# Patient Record
Sex: FEMALE | Race: WHITE | URBAN - METROPOLITAN AREA
[De-identification: names, ages, dates, MRNs, and addresses within clinical notes are randomized per-mention and may not be internally consistent; named-entity substitution may affect disease eponyms.]

---

## 2018-03-02 ENCOUNTER — OFFICE VISIT (OUTPATIENT)
Dept: FAMILY MEDICINE | Facility: CLINIC | Age: 83
End: 2018-03-02
Payer: MEDICARE

## 2018-03-02 VITALS
HEIGHT: 60 IN | SYSTOLIC BLOOD PRESSURE: 132 MMHG | OXYGEN SATURATION: 98 % | TEMPERATURE: 97.5 F | HEART RATE: 79 BPM | RESPIRATION RATE: 16 BRPM | WEIGHT: 183 LBS | DIASTOLIC BLOOD PRESSURE: 72 MMHG | BODY MASS INDEX: 35.93 KG/M2

## 2018-03-02 DIAGNOSIS — Z91.81 AT RISK FOR FALLING: ICD-10-CM

## 2018-03-02 DIAGNOSIS — Z23 NEED FOR PROPHYLACTIC VACCINATION WITH TETANUS-DIPHTHERIA (TD): ICD-10-CM

## 2018-03-02 DIAGNOSIS — G89.29 CHRONIC PAIN OF RIGHT KNEE: ICD-10-CM

## 2018-03-02 DIAGNOSIS — M25.561 CHRONIC PAIN OF RIGHT KNEE: ICD-10-CM

## 2018-03-02 DIAGNOSIS — G89.29 CHRONIC BILATERAL LOW BACK PAIN WITHOUT SCIATICA: Primary | ICD-10-CM

## 2018-03-02 DIAGNOSIS — M54.50 CHRONIC BILATERAL LOW BACK PAIN WITHOUT SCIATICA: Primary | ICD-10-CM

## 2018-03-02 DIAGNOSIS — Z78.0 ASYMPTOMATIC POSTMENOPAUSAL STATUS: ICD-10-CM

## 2018-03-02 DIAGNOSIS — Z23 NEED FOR PROPHYLACTIC VACCINATION AGAINST STREPTOCOCCUS PNEUMONIAE (PNEUMOCOCCUS): ICD-10-CM

## 2018-03-02 PROCEDURE — 99203 OFFICE O/P NEW LOW 30 MIN: CPT | Performed by: INTERNAL MEDICINE

## 2018-03-02 RX ORDER — ROSUVASTATIN CALCIUM 10 MG/1
TABLET, COATED ORAL
Refills: 0 | COMMUNITY
Start: 2017-12-06

## 2018-03-02 RX ORDER — HYDROCODONE BITARTRATE AND ACETAMINOPHEN 10; 325 MG/1; MG/1
1 TABLET ORAL EVERY 4 HOURS PRN
Qty: 18 TABLET | Refills: 0 | Status: SHIPPED | OUTPATIENT
Start: 2018-03-02

## 2018-03-02 RX ORDER — OFLOXACIN 3 MG/ML
SOLUTION/ DROPS OPHTHALMIC
COMMUNITY
Start: 2017-09-13

## 2018-03-02 RX ORDER — NITROGLYCERIN 0.6 MG/1
TABLET SUBLINGUAL
COMMUNITY
Start: 2017-04-07

## 2018-03-02 RX ORDER — LISINOPRIL 10 MG/1
TABLET ORAL
Refills: 0 | COMMUNITY
Start: 2017-12-06

## 2018-03-02 RX ORDER — LORATADINE 10 MG/1
TABLET ORAL
Refills: 3 | COMMUNITY
Start: 2018-02-16

## 2018-03-02 RX ORDER — AMLODIPINE BESYLATE 10 MG/1
TABLET ORAL
Refills: 0 | COMMUNITY
Start: 2017-12-06

## 2018-03-02 RX ORDER — FENOFIBRATE 160 MG/1
TABLET ORAL
Refills: 0 | COMMUNITY
Start: 2017-12-06

## 2018-03-02 RX ORDER — OXYBUTYNIN CHLORIDE 5 MG/1
TABLET, EXTENDED RELEASE ORAL
Refills: 0 | COMMUNITY
Start: 2017-12-06

## 2018-03-02 RX ORDER — PSEUDOEPHEDRINE HYDROCHLORIDE 60 MG/1
TABLET, FILM COATED ORAL
Refills: 0 | COMMUNITY
Start: 2018-01-02

## 2018-03-02 RX ORDER — GABAPENTIN 600 MG/1
TABLET ORAL
Refills: 0 | COMMUNITY
Start: 2017-12-06

## 2018-03-02 RX ORDER — HYDROCODONE BITARTRATE AND ACETAMINOPHEN 10; 325 MG/1; MG/1
TABLET ORAL
Refills: 0 | COMMUNITY
Start: 2017-06-12

## 2018-03-02 RX ORDER — BUDESONIDE AND FORMOTEROL FUMARATE DIHYDRATE 160; 4.5 UG/1; UG/1
AEROSOL RESPIRATORY (INHALATION)
Refills: 0 | COMMUNITY
Start: 2018-01-03

## 2018-03-02 RX ORDER — OMEPRAZOLE 40 MG/1
CAPSULE, DELAYED RELEASE ORAL
Refills: 0 | COMMUNITY
Start: 2018-02-21

## 2018-03-02 RX ORDER — MELOXICAM 7.5 MG/1
TABLET ORAL
Refills: 0 | COMMUNITY
Start: 2018-01-02

## 2018-03-02 NOTE — PROGRESS NOTES
SUBJECTIVE:   Miranda Shearer is a 82 year old female who presents to clinic today for the following health issues: Presents with twp daughters. She is from [place] and she is visiting her daughters in [place]. She will be going back to [place] shortly and won't be receiving any further medical care from [place] most likely beyond today only.     Musculoskeletal pain-- She notes that she has been having her back pain for several years and it has progressively been getting worse since the past 2 years. She reports that she has degenerative disc disease. She states that she has difficulty standing for more than 3 minutes. She has pain in her neck and cervicogenic headache, but she denies any arm or shoulder pain. She notes that she has weakness in her right leg. She has been favoring her right leg and she is also having tenderness in her right knee. Patient notes that her knee gives out on her and she is afraid of walking. She uses a walker. 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 She takes 2 Tylenol 500 MG twice a day. She takes 1/2 tablet of her hydrocodone 10 MG when she had a flare up of her pain after exerting herself. She started using her hydrocodone since June 2017 and she has not had any refills since then because she does not use it often. She has had monthly injections on her back which were beneficial for her pain.       Problem list and histories reviewed & adjusted, as indicated.  Additional history: as documented    There is no problem list on file for this patient.    History reviewed. No pertinent surgical history.    Social History   Substance Use Topics     Smoking status: Never Smoker     Smokeless tobacco: Never Used     Alcohol use No     History reviewed. No pertinent family history.      Current Outpatient Prescriptions   Medication Sig Dispense Refill     meloxicam (MOBIC) 7.5 MG tablet   0     nitroGLYcerin (NITROSTAT) 0.6 MG sublingual tablet         gabapentin (NEURONTIN) 600 MG tablet   0     amLODIPine (NORVASC) 10 MG tablet   0     oxybutynin (DITROPAN-XL) 5 MG 24 hr tablet   0     omeprazole (PRILOSEC) 40 MG capsule   0     lisinopril (PRINIVIL/ZESTRIL) 10 MG tablet   0     rosuvastatin (CRESTOR) 10 MG tablet   0     fenofibrate 160 MG tablet   0     ranitidine (ZANTAC) 150 MG tablet   0     SUDOGEST 60 MG tablet   0     ofloxacin (OCUFLOX) 0.3 % ophthalmic solution        loratadine (CLARITIN) 10 MG tablet   3     HYDROcodone-acetaminophen (NORCO)  MG per tablet   0     SYMBICORT 160-4.5 MCG/ACT Inhaler   0     HYDROcodone-acetaminophen (NORCO)  MG per tablet Take 1 tablet by mouth every 4 hours as needed for moderate to severe pain maximum 2 tablet(s) per day 18 tablet 0     Labs reviewed in EPIC    Reviewed and updated as needed this visit by clinical staff  Tobacco  Allergies  Meds  Med Hx  Surg Hx  Fam Hx  Soc Hx      Reviewed and updated as needed this visit by Provider         ROS:  Constitutional, HEENT, cardiovascular, pulmonary, GI, , musculoskeletal, neuro, skin, endocrine and psych systems are negative, except as otherwise noted.    This document serves as a record of the services and decisions personally performed and made by Zohaib Rainey MD. It was created on their behalf by Jude Nolen, a trained medical scribe. The creation of this document is based the provider's statements to the medical scribe.  Jude Nolen  March 2, 2018 11:01 AM   OBJECTIVE:     /72  Pulse 79  Temp 97.5  F (36.4  C) (Oral)  Resp 16  Ht 1.524 m (5')  Wt 83 kg (183 lb)  SpO2 98%  BMI 35.74 kg/m2  Body mass index is 35.74 kg/(m^2).  GENERAL: healthy, alert and no distress, overweight, she uses a walker.   EYES: Eyes grossly normal to inspection, EOMI, conjunctivae and sclerae normal  NECK: no adenopathy, no asymmetry, masses, or scars and thyroid normal to palpation  ABDOMEN: soft, nontender, no hepatosplenomegaly, no masses and  bowel sounds normal  MS: Low back and neck tenderness to palpation, cervicogenic headache. Negative straight leg raising. Right Knee tenderness   SKIN: no suspicious lesions or rashes to visible skin   NEURO: Mentation intact and speech normal  PSYCH: mentation appears normal, affect normal/bright    Diagnostic Test Results:  No results found for this or any previous visit.    ASSESSMENT/PLAN:   (M54.5,  G89.29) Chronic bilateral low back pain without sciatica  (primary encounter diagnosis)  Comment: I reviewed this patients history in significant detail  And spoke with patient and daughters at some length . The patient uses infrequent chronic pain medication and is having a currently difficult time with her traveling and a major flare-up of low back pain which appears to be most likely secondary to lumbar degenerative disc disease and lumbar degenerative joint disease . In consideration of the circumstances I see a small amount of HYDROcodone (VICODIN) as a non-problem, which is primarily the purpose of this appointment. She has a prior prescription of this from her primary care physician in [place] and there's absolutely no red flag symptoms or issues today   Plan: HYDROcodone-acetaminophen (NORCO)  MG per        tablet            (M25.561,  G89.29) Chronic pain of right knee  Comment: as detailed above   Plan: HYDROcodone-acetaminophen (NORCO)  MG per        tablet            (Z78.0) Asymptomatic postmenopausal status  Comment:   Plan:     (Z91.81) At risk for falling  Comment:   Plan:     (Z23) Need for prophylactic vaccination against Streptococcus pneumoniae (pneumococcus)  Comment:   Plan:     (Z23) Need for prophylactic vaccination with tetanus-diphtheria (TD)  Comment:   Plan:     Patient Instructions     [facility]    If you have any questions regarding to your visit please contact your care team:     Team Pink:   Clinic Hours Telephone Number   Internal Medicine:  Dr. Keller  Issac Najera, NP       7am-7pm  Monday - Thursday   7am-5pm  Fridays  [phone]  (Appointment scheduling available 24/7)    Questions about your visit?  Team Line  [phone]   Urgent [facility] - [place] [facility] - 11am-9pm Monday-Friday Saturday-Sunday- 9am-5pm   [place] - 5pm-9pm Monday-Friday Saturday-Sunday- 9am-5pm  [phone] - [place]   [phone] - [place]       What options do I have for visits at the clinic other than the traditional office visit?  To expand how we care for you, many of our providers are utilizing electronic visits (e-visits) and telephone visits, when medically appropriate, for interactions with their patients rather than a visit in the clinic.   We also offer nurse visits for many medical concerns. Just like any other service, we will bill your insurance company for this type of visit based on time spent on the phone with your provider. Not all insurance companies cover these visits. Please check with your medical insurance if this type of visit is covered. You will be responsible for any charges that are not paid by your insurance.      E-visits via [email or web address]:  generally incur a $35.00 fee.  Telephone visits:  Time spent on the phone: *charged based on time that is spent on the phone in increments of 10 minutes. Estimated cost:   5-10 mins $30.00   11-20 mins. $59.00   21-30 mins. $85.00   Use LifeWavehart (secure email communication and access to your chart) to send your primary care provider a message or make an appointment. Ask someone on your Team how to sign up for UVLrx Therapeuticst.    For a Price Quote for your services, please call our Consumer Price Line at [phone].    As always, Thank you for trusting us with your health care needs!    Melissa DIAZ CMA ([facility])        The information in this document, created by the medical scribe for me, accurately reflects the services I personally performed and the decisions made by me. I have  reviewed and approved this document for accuracy.   MD Zohaib Damian MD  [facility]

## 2018-03-02 NOTE — PATIENT INSTRUCTIONS
[facility]    If you have any questions regarding to your visit please contact your care team:     Team Pink:   Clinic Hours Telephone Number   Internal Medicine:  Dr. Arianna Najera NP       7am-7pm  Monday - Thursday   7am-5pm  Fridays  [phone]  (Appointment scheduling available 24/7)    Questions about your visit?  Team Line  [phone]   Urgent [facility] - [place] [facility] - 11am-9pm Monday-Friday Saturday-Sunday- 9am-5pm   [place] - 5pm-9pm Monday-Friday Saturday-Sunday- 9am-5pm  [phone] - [place]   [phone] - [place]       What options do I have for visits at the clinic other than the traditional office visit?  To expand how we care for you, many of our providers are utilizing electronic visits (e-visits) and telephone visits, when medically appropriate, for interactions with their patients rather than a visit in the clinic.   We also offer nurse visits for many medical concerns. Just like any other service, we will bill your insurance company for this type of visit based on time spent on the phone with your provider. Not all insurance companies cover these visits. Please check with your medical insurance if this type of visit is covered. You will be responsible for any charges that are not paid by your insurance.      E-visits via When You Wish:  generally incur a $35.00 fee.  Telephone visits:  Time spent on the phone: *charged based on time that is spent on the phone in increments of 10 minutes. Estimated cost:   5-10 mins $30.00   11-20 mins. $59.00   21-30 mins. $85.00   Use ZOOM TVt (secure email communication and access to your chart) to send your primary care provider a message or make an appointment. Ask someone on your Team how to sign up for When You Wish.    For a Price Quote for your services, please call our Consumer Price Line at [phone].    As always, Thank you for trusting us with your health care  needs!    Melissa DIAZ CMA ([facility])

## 2018-03-02 NOTE — MR AVS SNAPSHOT
After Visit Summary   3/2/2018    Miranda Shearer    MRN: [id number]           Patient Information     Date Of Birth          1935        Visit Information        Provider Department      3/2/2018 10:50 AM Zohaib Rainey MD [facility]        Today's Diagnoses     Chronic bilateral low back pain without sciatica    -  1    Chronic pain of right knee        Asymptomatic postmenopausal status        At risk for falling        Need for prophylactic vaccination against Streptococcus pneumoniae (pneumococcus)        Need for prophylactic vaccination with tetanus-diphtheria (TD)          Care Instructions    [facility]    If you have any questions regarding to your visit please contact your care team:     Team Pink:   Clinic Hours Telephone Number   Internal Medicine:  Dr. Arianna Najera NP       7am-7pm  Monday - Thursday   7am-5pm  Fridays  [phone]  (Appointment scheduling available 24/7)    Questions about your visit?  Team Line  [phone]   Urgent Care - [place] - 11am-9pm Monday-Friday Saturday-Sunday- 9am-5pm   [place] - 5pm-9pm Monday-Friday Saturday-Sunday- 9am-5pm  [phone] - [place]   [phone] - [place]       What options do I have for visits at the clinic other than the traditional office visit?  To expand how we care for you, many of our providers are utilizing electronic visits (e-visits) and telephone visits, when medically appropriate, for interactions with their patients rather than a visit in the clinic.   We also offer nurse visits for many medical concerns. Just like any other service, we will bill your insurance company for this type of visit based on time spent on the phone with your provider. Not all insurance companies cover these visits. Please check with your medical insurance if this type of visit is covered. You will be responsible for any charges that are not  "paid by your insurance.      E-visits via ReaMetrixt:  generally incur a $35.00 fee.  Telephone visits:  Time spent on the phone: *charged based on time that is spent on the phone in increments of 10 minutes. Estimated cost:   5-10 mins $30.00   11-20 mins. $59.00   21-30 mins. $85.00   Use MabLytehart (secure email communication and access to your chart) to send your primary care provider a message or make an appointment. Ask someone on your Team how to sign up for ReaMetrixt.    For a Price Quote for your services, please call our Digital Lifeboat Line at [phone].    As always, Thank you for trusting us with your health care needs!    Melissa DIAZ CMA ([facility])            Follow-ups after your visit        Who to contact     If you have questions or need follow up information about today's clinic visit or your schedule please contact [facility] directly at [phone].  Normal or non-critical lab and imaging results will be communicated to you by MabLytehart, letter or phone within 4 business days after the clinic has received the results. If you do not hear from us within 7 days, please contact the clinic through ReaMetrixt or phone. If you have a critical or abnormal lab result, we will notify you by phone as soon as possible.  Submit refill requests through Operax or call your pharmacy and they will forward the refill request to us. Please allow 3 business days for your refill to be completed.          Additional Information About Your Visit        Operax Information     Operax lets you send messages to your doctor, view your test results, renew your prescriptions, schedule appointments and more. To sign up, go to [email or web address] . Click on \"Log in\" on the left side of the screen, which will take you to the Welcome page. Then click on \"Sign up Now\" on the right side of the page.     You will be asked to enter the access code listed below, as well as some personal information. Please follow the directions " to create your username and password.     Your access code is: [email or web address]  Expires: 2018 11:25 AM     Your access code will  in 90 days. If you need help or a new code, please call your [facility] or [phone].        Care EveryWhere ID     This is your Care EveryWhere ID. This could be used by other organizations to access your [facility] medical records  [id number]        Your Vitals Were     Pulse Temperature Respirations Height Pulse Oximetry BMI (Body Mass Index)    79 97.5  F (36.4  C) (Oral) 16 5' (1.524 m) 98% 35.74 kg/m2       Blood Pressure from Last 3 Encounters:   18 132/72    Weight from Last 3 Encounters:   18 183 lb (83 kg)              Today, you had the following     No orders found for display         Today's Medication Changes          These changes are accurate as of 3/2/18 11:25 AM.  If you have any questions, ask your nurse or doctor.               These medicines have changed or have updated prescriptions.        Dose/Directions    * HYDROcodone-acetaminophen  MG per tablet   Commonly known as:  NORCO   This may have changed:  Another medication with the same name was added. Make sure you understand how and when to take each.   Changed by:  Zohaib Rainey MD        Refills:  0       * HYDROcodone-acetaminophen  MG per tablet   Commonly known as:  NORCO   This may have changed:  You were already taking a medication with the same name, and this prescription was added. Make sure you understand how and when to take each.   Used for:  Chronic bilateral low back pain without sciatica, Chronic pain of right knee   Changed by:  Zohaib Rainey MD        Dose:  1 tablet   Take 1 tablet by mouth every 4 hours as needed for moderate to severe pain maximum 2 tablet(s) per day   Quantity:  18 tablet   Refills:  0       * Notice:  This list has 2 medication(s) that are the same as other medications prescribed for you. Read the directions carefully, and ask your  doctor or other care provider to review them with you.         Where to get your medicines      Some of these will need a paper prescription and others can be bought over the counter.  Ask your nurse if you have questions.     Bring a paper prescription for each of these medications     HYDROcodone-acetaminophen  MG per tablet                Primary Care Provider Fax #    Physician No Ref-Primary [phone]       No address on file        Equal Access to Services     [facility] : Hadii aad ku hadasho Soomaali, waaxda luqadaha, qaybta kaalmada adeegyada, waxay echoin hayadolphn ademarisela juniorjenn lara . So [facility] [phone].    ATENCIÓN: Si habla español, tiene a monge disposición servicios gratuitos de asistencia lingüística. Elsy al [phone].    We comply with applicable federal civil rights laws and [place] laws. We do not discriminate on the basis of race, color, national origin, age, disability, sex, sexual orientation, or gender identity.            Thank you!     Thank you for [facility]  for your care. Our goal is always to provide you with excellent care. Hearing back from our patients is one way we can continue to improve our services. Please take a few minutes to complete the written survey that you may receive in the mail after your visit with us. Thank you!             Your Updated Medication List - Protect others around you: Learn how to safely use, store and throw away your medicines at www.disposemymeds.org.          This list is accurate as of 3/2/18 11:25 AM.  Always use your most recent med list.                   Brand Name Dispense Instructions for use Diagnosis    amLODIPine 10 MG tablet    NORVASC          fenofibrate 160 MG tablet           gabapentin 600 MG tablet    NEURONTIN          * HYDROcodone-acetaminophen  MG per tablet    NORCO          * HYDROcodone-acetaminophen  MG per tablet    NORCO    18 tablet    Take 1 tablet by mouth every 4 hours as  needed for moderate to severe pain maximum 2 tablet(s) per day    Chronic bilateral low back pain without sciatica, Chronic pain of right knee       lisinopril 10 MG tablet    PRINIVIL/ZESTRIL          loratadine 10 MG tablet    CLARITIN          meloxicam 7.5 MG tablet    MOBIC          nitroGLYcerin 0.6 MG sublingual tablet    NITROSTAT          ofloxacin 0.3 % ophthalmic solution    OCUFLOX          omeprazole 40 MG capsule    priLOSEC          oxybutynin 5 MG 24 hr tablet    DITROPAN-XL          ranitidine 150 MG tablet    ZANTAC          rosuvastatin 10 MG tablet    CRESTOR          SUDOGEST 60 MG tablet   Generic drug:  pseudoePHEDrine           SYMBICORT 160-4.5 MCG/ACT Inhaler   Generic drug:  budesonide-formoterol           * Notice:  This list has 2 medication(s) that are the same as other medications prescribed for you. Read the directions carefully, and ask your doctor or other care provider to review them with you.